# Patient Record
Sex: FEMALE | Race: BLACK OR AFRICAN AMERICAN | NOT HISPANIC OR LATINO | Employment: STUDENT | URBAN - METROPOLITAN AREA
[De-identification: names, ages, dates, MRNs, and addresses within clinical notes are randomized per-mention and may not be internally consistent; named-entity substitution may affect disease eponyms.]

---
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
## 2018-02-02 ENCOUNTER — HOSPITAL ENCOUNTER (EMERGENCY)
Facility: HOSPITAL | Age: 18
Discharge: HOME | End: 2018-02-02
Attending: EMERGENCY MEDICINE
Payer: MEDICAID

## 2018-02-02 VITALS
HEIGHT: 62 IN | BODY MASS INDEX: 25.03 KG/M2 | TEMPERATURE: 98 F | HEART RATE: 69 BPM | RESPIRATION RATE: 18 BRPM | WEIGHT: 136 LBS | DIASTOLIC BLOOD PRESSURE: 62 MMHG | SYSTOLIC BLOOD PRESSURE: 122 MMHG | OXYGEN SATURATION: 100 %

## 2018-02-02 DIAGNOSIS — S81.012A LACERATION OF LEFT KNEE, INITIAL ENCOUNTER: Primary | ICD-10-CM

## 2018-02-02 DIAGNOSIS — M25.562 LEFT KNEE PAIN: ICD-10-CM

## 2018-02-02 LAB
B-HCG UR QL: NEGATIVE
CTP QC/QA: YES

## 2018-02-02 PROCEDURE — 25000003 PHARM REV CODE 250: Performed by: PHYSICIAN ASSISTANT

## 2018-02-02 PROCEDURE — 12001 RPR S/N/AX/GEN/TRNK 2.5CM/<: CPT

## 2018-02-02 PROCEDURE — 12031 INTMD RPR S/A/T/EXT 2.5 CM/<: CPT

## 2018-02-02 PROCEDURE — 99284 EMERGENCY DEPT VISIT MOD MDM: CPT | Mod: 25

## 2018-02-02 PROCEDURE — 81025 URINE PREGNANCY TEST: CPT | Performed by: EMERGENCY MEDICINE

## 2018-02-02 RX ORDER — LIDOCAINE HYDROCHLORIDE 10 MG/ML
10 INJECTION INFILTRATION; PERINEURAL
Status: COMPLETED | OUTPATIENT
Start: 2018-02-02 | End: 2018-02-02

## 2018-02-02 RX ORDER — ACETAMINOPHEN 325 MG/1
650 TABLET ORAL
Status: COMPLETED | OUTPATIENT
Start: 2018-02-02 | End: 2018-02-02

## 2018-02-02 RX ORDER — MUPIROCIN 20 MG/G
OINTMENT TOPICAL 3 TIMES DAILY
Qty: 1 TUBE | Refills: 0 | Status: SHIPPED | OUTPATIENT
Start: 2018-02-02 | End: 2018-02-12

## 2018-02-02 RX ADMIN — ACETAMINOPHEN 650 MG: 325 TABLET ORAL at 09:02

## 2018-02-02 RX ADMIN — BACITRACIN, NEOMYCIN, POLYMYXIN B 1 EACH: 400; 3.5; 5 OINTMENT TOPICAL at 09:02

## 2018-02-02 RX ADMIN — LIDOCAINE HYDROCHLORIDE 10 ML: 10 INJECTION, SOLUTION INFILTRATION; PERINEURAL at 09:02

## 2018-02-02 NOTE — ED PROVIDER NOTES
Encounter Date: 2/2/2018    SCRIBE #1 NOTE: I, Kalina Degroot, am scribing for, and in the presence of,  Homar Odom PA-C. I have scribed the following portions of the note - Other sections scribed: HPI and ROS.       History     Chief Complaint   Patient presents with    Laceration     to left knee after bed railing fell on her. Pt has [place] laceration thats bleeding     CC: Laceration    HPI: The pt is a 17 y.o. F with no pertinent PMHx who presents to the ED c/o an actively bleeding wound to the lateral side of her L knee that she incurred this morning after the hook from her bed railing punctured her. Pt rates pain from wound as severe (10/10). No attempted treatments reported. She states that her vaccinations are up to date. Pt otherwise denies fever and other associated symptoms.       The history is provided by the patient. No  was used.     Review of patient's allergies indicates:  No Known Allergies  No past medical history on file.  Past Surgical History:   Procedure Laterality Date    VAGINA SURGERY       No family history on file.  Social History   Substance Use Topics    Smoking status: Never Smoker    Smokeless tobacco: Never Used    Alcohol use No     Review of Systems   Constitutional: Negative for chills, diaphoresis and fever.   HENT: Negative for ear pain and sore throat.    Eyes: Negative for redness.   Respiratory: Negative for cough and shortness of breath.    Cardiovascular: Negative for chest pain.   Gastrointestinal: Negative for abdominal pain, diarrhea, nausea and vomiting.   Genitourinary: Negative for dysuria.   Musculoskeletal: Negative for back pain.   Skin: Positive for wound (puncture wound to lateral side of L knee, 10/10, actively bleeding). Negative for rash.   Neurological: Negative for headaches.       Physical Exam     Initial Vitals [02/02/18 0844]   BP Pulse Resp Temp SpO2   (!) 152/73 81 18 98.2 °F (36.8 °C) 100 %      MAP       99.33         Physical  Exam    Nursing note and vitals reviewed.  Constitutional: She appears well-developed and well-nourished. She is not diaphoretic. No distress.   HENT:   Head: Normocephalic and atraumatic.   Nose: Nose normal.   Eyes: Conjunctivae and EOM are normal. Right eye exhibits no discharge. Left eye exhibits no discharge.   Neck: Normal range of motion. No tracheal deviation present. No JVD present.   Cardiovascular: Normal rate, regular rhythm and normal heart sounds. Exam reveals no friction rub.    No murmur heard.  Pulmonary/Chest: Breath sounds normal. No stridor. No respiratory distress. She has no wheezes. She has no rhonchi. She has no rales. She exhibits no tenderness.   Musculoskeletal:        Legs:  Half centimeter slightly gaping puncture wound to the lateral aspect of the left anterior knee.  Very slow active bleeding.  No visible or palpable foreign body.  Full range of motion of the knee without pain.  Pedal pulses 2+ and equal.  Sensation intact.  Ambulating without limp or pain. No hip or ankle TTP.    Neurological: She is alert and oriented to person, place, and time.   Skin: Skin is warm and dry. No rash and no abscess noted. No erythema. No pallor.         ED Course   Lac Repair  Date/Time: 2/2/2018 12:32 PM  Performed by: WINTER ORONA  Authorized by: JAIRO CHOPRA   Consent Done: Yes  Consent: Verbal consent obtained.  Consent given by: patient  Location: L knee.  Wound length (cm): 0.5cm.  Foreign bodies: no foreign bodies  Tendon involvement: none  Nerve involvement: none  Vascular damage: no  Anesthesia: local infiltration    Anesthesia:  Local Anesthetic: lidocaine 1% without epinephrine  Anesthetic total: 4 mL  Patient sedated: no  Preparation: Patient was prepped and draped in the usual sterile fashion.  Irrigation solution: saline  Irrigation method: jet lavage  Amount of cleaning: extensive  Debridement: none  Degree of undermining: none  Skin closure: 3-0 nylon  Number of sutures:  2  Technique: simple  Approximation: close  Approximation difficulty: simple  Dressing: antibiotic ointment and dressing applied  Patient tolerance: Patient tolerated the procedure well with no immediate complications        Labs Reviewed   POCT URINE PREGNANCY          X-Rays:   Independently Interpreted Readings:   Other Readings:  No acute fracture.    Medical Decision Making:   History:   Old Medical Records: I decided to obtain old medical records.      This is an emergent evaluation of a 17 y.o. female with no PMHx presenting to the ED for a laceration. Denies fever, numbness, and other injury. Vitals WNL, afebrile. Patient is non-toxic appearing and in no acute distress. No evidence of foreign body, acute fracture/dislocation, or osteomyelitis on xray. No neurovascular compromise or injury. No evidence of tendon disruption or ligamentous injury. No evidence for infection at this time.     Laceration will require closure to achieve and maintain hemostasis and to promote optimal wound healing after the wound is copiously irrigated at high pressure.Tetanus status is UTD per patient. Dressed with antibiotic ointment and non-adherent dressing. Discharged with mupirocin and instructed to follow up with PCP for reevaluation and suture removal in 10-14 days.       I discussed with the patient the diagnosis, treatment plan, indications for return to the emergency department, and for expected follow-up. The patient verbalized an understanding. The patient is asked if there are any questions or concerns. We discuss the case, until all issues are addressed to the patients satisfaction. Patient understands and is agreeable to the plan.     I discussed this patient with Dr. Roberto who is in agreement with my assessment and plan.           Scribe Attestation:   Scribe #1: I performed the above scribed service and the documentation accurately describes the services I performed. I attest to the accuracy of the  note.    Attending Attestation:     Physician Attestation Statement for NP/PA:   I discussed this assessment and plan of this patient with the NP/PA, but I did not personally examine the patient. The face to face encounter was performed by the NP/PA.        Physician Attestation for Scribe:  Physician Attestation Statement for Scribe #1: I, Homar Odom PA-C, reviewed documentation, as scribed by Kalina Degroot in my presence, and it is both accurate and complete.                 ED Course      Clinical Impression:   The primary encounter diagnosis was Laceration of left knee, initial encounter. A diagnosis of Left knee pain was also pertinent to this visit.                           Homar Odom PA-C  02/02/18 1231       Lionel Roberto MD  02/04/18 1308

## 2018-03-06 ENCOUNTER — HOSPITAL ENCOUNTER (EMERGENCY)
Facility: HOSPITAL | Age: 18
Discharge: HOME | End: 2018-03-06
Attending: EMERGENCY MEDICINE
Payer: MEDICAID

## 2018-03-06 VITALS
OXYGEN SATURATION: 100 % | HEIGHT: 62 IN | BODY MASS INDEX: 26.5 KG/M2 | HEART RATE: 80 BPM | TEMPERATURE: 98 F | WEIGHT: 144 LBS | SYSTOLIC BLOOD PRESSURE: 111 MMHG | RESPIRATION RATE: 18 BRPM | DIASTOLIC BLOOD PRESSURE: 71 MMHG

## 2018-03-06 DIAGNOSIS — R10.9 ABDOMINAL PAIN, UNSPECIFIED ABDOMINAL LOCATION: ICD-10-CM

## 2018-03-06 DIAGNOSIS — B37.9 YEAST INFECTION: Primary | ICD-10-CM

## 2018-03-06 DIAGNOSIS — N76.0 BACTERIAL VAGINOSIS: ICD-10-CM

## 2018-03-06 DIAGNOSIS — B96.89 BACTERIAL VAGINOSIS: ICD-10-CM

## 2018-03-06 LAB
ALBUMIN SERPL BCP-MCNC: 4.3 G/DL
ALP SERPL-CCNC: 77 U/L
ALT SERPL W/O P-5'-P-CCNC: 23 U/L
ANION GAP SERPL CALC-SCNC: 5 MMOL/L
AST SERPL-CCNC: 25 U/L
B-HCG UR QL: NEGATIVE
BACTERIA #/AREA URNS HPF: ABNORMAL /HPF
BACTERIA GENITAL QL WET PREP: ABNORMAL
BASOPHILS # BLD AUTO: 0.04 K/UL
BASOPHILS NFR BLD: 0.6 %
BILIRUB SERPL-MCNC: 0.4 MG/DL
BILIRUB UR QL STRIP: NEGATIVE
BUN SERPL-MCNC: 7 MG/DL
CALCIUM SERPL-MCNC: 9.3 MG/DL
CHLORIDE SERPL-SCNC: 109 MMOL/L
CLARITY UR: CLEAR
CLUE CELLS VAG QL WET PREP: ABNORMAL
CO2 SERPL-SCNC: 27 MMOL/L
COLOR UR: YELLOW
CREAT SERPL-MCNC: 0.8 MG/DL
CTP QC/QA: YES
DIFFERENTIAL METHOD: ABNORMAL
EOSINOPHIL # BLD AUTO: 0.5 K/UL
EOSINOPHIL NFR BLD: 8 %
ERYTHROCYTE [DISTWIDTH] IN BLOOD BY AUTOMATED COUNT: 13.3 %
EST. GFR  (AFRICAN AMERICAN): >60 ML/MIN/1.73 M^2
EST. GFR  (NON AFRICAN AMERICAN): >60 ML/MIN/1.73 M^2
FILAMENT FUNGI VAG WET PREP-#/AREA: ABNORMAL
GLUCOSE SERPL-MCNC: 82 MG/DL
GLUCOSE UR QL STRIP: NEGATIVE
HCT VFR BLD AUTO: 40.2 %
HGB BLD-MCNC: 13.4 G/DL
HGB UR QL STRIP: ABNORMAL
KETONES UR QL STRIP: NEGATIVE
LEUKOCYTE ESTERASE UR QL STRIP: ABNORMAL
LIPASE SERPL-CCNC: 37 U/L
LYMPHOCYTES # BLD AUTO: 1.7 K/UL
LYMPHOCYTES NFR BLD: 25.8 %
MCH RBC QN AUTO: 26.7 PG
MCHC RBC AUTO-ENTMCNC: 33.3 G/DL
MCV RBC AUTO: 80 FL
MICROSCOPIC COMMENT: ABNORMAL
MONOCYTES # BLD AUTO: 0.6 K/UL
MONOCYTES NFR BLD: 8.9 %
NEUTROPHILS # BLD AUTO: 3.8 K/UL
NEUTROPHILS NFR BLD: 56.7 %
NITRITE UR QL STRIP: NEGATIVE
PH UR STRIP: 6 [PH] (ref 5–8)
PLATELET # BLD AUTO: 237 K/UL
PMV BLD AUTO: 10.2 FL
POTASSIUM SERPL-SCNC: 4.2 MMOL/L
PROT SERPL-MCNC: 7.4 G/DL
PROT UR QL STRIP: NEGATIVE
RBC # BLD AUTO: 5.01 M/UL
RBC #/AREA URNS HPF: 10 /HPF (ref 0–4)
SODIUM SERPL-SCNC: 141 MMOL/L
SP GR UR STRIP: 1.02 (ref 1–1.03)
SPECIMEN SOURCE: ABNORMAL
T VAGINALIS GENITAL QL WET PREP: ABNORMAL
URN SPEC COLLECT METH UR: ABNORMAL
UROBILINOGEN UR STRIP-ACNC: NEGATIVE EU/DL
WBC # BLD AUTO: 6.74 K/UL
WBC #/AREA URNS HPF: 2 /HPF (ref 0–5)
WBC #/AREA VAG WET PREP: ABNORMAL
YEAST GENITAL QL WET PREP: ABNORMAL

## 2018-03-06 PROCEDURE — 87210 SMEAR WET MOUNT SALINE/INK: CPT

## 2018-03-06 PROCEDURE — 85025 COMPLETE CBC W/AUTO DIFF WBC: CPT

## 2018-03-06 PROCEDURE — 81025 URINE PREGNANCY TEST: CPT | Performed by: EMERGENCY MEDICINE

## 2018-03-06 PROCEDURE — 81000 URINALYSIS NONAUTO W/SCOPE: CPT

## 2018-03-06 PROCEDURE — 99284 EMERGENCY DEPT VISIT MOD MDM: CPT

## 2018-03-06 PROCEDURE — 87491 CHLMYD TRACH DNA AMP PROBE: CPT

## 2018-03-06 PROCEDURE — 25000003 PHARM REV CODE 250: Performed by: NURSE PRACTITIONER

## 2018-03-06 PROCEDURE — 83690 ASSAY OF LIPASE: CPT

## 2018-03-06 PROCEDURE — 80053 COMPREHEN METABOLIC PANEL: CPT

## 2018-03-06 RX ORDER — ONDANSETRON 4 MG/1
4 TABLET, ORALLY DISINTEGRATING ORAL EVERY 8 HOURS PRN
Qty: 10 TABLET | Refills: 0 | Status: SHIPPED | OUTPATIENT
Start: 2018-03-06

## 2018-03-06 RX ORDER — NAPROXEN 500 MG/1
500 TABLET ORAL
Status: COMPLETED | OUTPATIENT
Start: 2018-03-06 | End: 2018-03-06

## 2018-03-06 RX ORDER — FLUCONAZOLE 150 MG/1
150 TABLET ORAL DAILY
Qty: 1 TABLET | Refills: 0 | Status: SHIPPED | OUTPATIENT
Start: 2018-03-06 | End: 2018-03-07

## 2018-03-06 RX ORDER — METRONIDAZOLE 500 MG/1
500 TABLET ORAL EVERY 12 HOURS
Qty: 14 TABLET | Refills: 0 | Status: SHIPPED | OUTPATIENT
Start: 2018-03-06 | End: 2018-03-13

## 2018-03-06 RX ADMIN — NAPROXEN 500 MG: 500 TABLET ORAL at 02:03

## 2018-03-06 NOTE — ED TRIAGE NOTES
Patient came in PER personal transport with c/o RIGHT sided abdominal pain that wraps around the back. Patient denies any N/V at this time.

## 2018-03-06 NOTE — ED PROVIDER NOTES
Encounter Date: 3/6/2018    SCRIBE #1 NOTE: I, Sherif Santana, am scribing for, and in the presence of,  Ginger Elliott NP. I have scribed the following portions of the note - Other sections scribed: HPI and ROS.       History     Chief Complaint   Patient presents with    Abdominal Pain     RLQ pain since yesterday.  Pain has gotten worse     CC: Abdominal Pain    HPI: 17 y.o. female with no pertinent PMHx presents to ED c/o acute RLQ abdominal pain radiating to the back with associated nausea and decreased appetite since yesterday. Patient characterizes her pain as sharp and shooting. She additionally reports an episode of diarrhea 2 nights ago. She had a normal bowel movement yesterday. Patient started her menstrual cycle on 02/28/18. She previously didn't have a period for 2 mos secondary to her birth control implant. She endorses having cramps at the beginning of her cycle. She uses tampons and pads. No treatment attempted. Patient is sexually active. She denies vaginal discharge and malodor. Denies emesis, dysuria, and frequency.         The history is provided by the patient. No  was used.     Review of patient's allergies indicates:  No Known Allergies  History reviewed. No pertinent past medical history.  Past Surgical History:   Procedure Laterality Date    VAGINA SURGERY       History reviewed. No pertinent family history.  Social History   Substance Use Topics    Smoking status: Never Smoker    Smokeless tobacco: Never Used    Alcohol use No     Review of Systems   Constitutional: Positive for appetite change (decreased). Negative for diaphoresis and fever.   Gastrointestinal: Positive for abdominal pain (RLQ), diarrhea and nausea. Negative for vomiting.   Genitourinary: Negative for dysuria, frequency, pelvic pain and vaginal discharge.       Physical Exam     Initial Vitals [03/06/18 1218]   BP Pulse Resp Temp SpO2   123/63 77 16 98.5 °F (36.9 °C) 100 %      MAP       83          Physical Exam    Nursing note and vitals reviewed.  Constitutional: Vital signs are normal. She appears well-developed and well-nourished. She is cooperative.  Non-toxic appearance. She does not have a sickly appearance. She does not appear ill. No distress.   HENT:   Head: Normocephalic and atraumatic.   Eyes: Conjunctivae and EOM are normal. Pupils are equal, round, and reactive to light.   Neck: Normal range of motion.   Cardiovascular: Normal rate, regular rhythm, normal heart sounds, intact distal pulses and normal pulses. Exam reveals no decreased pulses.    No murmur heard.  Pulmonary/Chest: Effort normal and breath sounds normal. No respiratory distress. She has no decreased breath sounds. She has no wheezes. She exhibits no tenderness.   Abdominal: Soft. Normal appearance and bowel sounds are normal. She exhibits no distension and no mass. There is no hepatosplenomegaly. There is tenderness in the right lower quadrant. There is no rigidity, no rebound, no guarding, no CVA tenderness, no tenderness at McBurney's point and negative Fonseca's sign. No hernia.       Tenderness to palpation to right lower quadrant, negative psoas and obturator sign, negative McBurney's point   Genitourinary: Rectum normal and uterus normal. Pelvic exam was performed with patient supine. Cervix exhibits no motion tenderness, no discharge and no friability. Right adnexum displays no mass, no tenderness and no fullness. Left adnexum displays no mass, no tenderness and no fullness. There is bleeding in the vagina.   Genitourinary Comments: Small amount of blood noted within the vaginal vault, cervical os is closed with no blood coming through the os, no petechiae or lesions noted, no friability no CMT, no trauma to the vaginal vault, no tenderness to palpation of bilateral adnexal areas and no suprapubic tenderness.   Musculoskeletal: Normal range of motion.   Lymphadenopathy:        Right: No inguinal adenopathy present.         Left: No inguinal adenopathy present.   Neurological: She is alert and oriented to person, place, and time. She has normal reflexes.   Skin: Skin is warm, dry and intact. Capillary refill takes less than 2 seconds. No pallor.   Psychiatric: She has a normal mood and affect. Her behavior is normal. Judgment and thought content normal.         ED Course   Procedures  Labs Reviewed   URINALYSIS - Abnormal; Notable for the following:        Result Value    Occult Blood UA 3+ (*)     Leukocytes, UA Trace (*)     All other components within normal limits   URINALYSIS MICROSCOPIC - Abnormal; Notable for the following:     RBC, UA 10 (*)     All other components within normal limits   COMPREHENSIVE METABOLIC PANEL - Abnormal; Notable for the following:     Anion Gap 5 (*)     All other components within normal limits   CBC W/ AUTO DIFFERENTIAL - Abnormal; Notable for the following:     MCV 80 (*)     MCH 26.7 (*)     All other components within normal limits   VAGINAL SCREEN - Abnormal; Notable for the following:     Clue Cells, Wet Prep Few (*)     Budding Yeast Few (*)     WBC - Vaginal Screen Few (*)     Bacteria - Vaginal Screen Few (*)     All other components within normal limits   C. TRACHOMATIS/N. GONORRHOEAE BY AMP DNA   LIPASE   URINALYSIS   POCT URINE PREGNANCY                   APC / Resident Notes:   Antoni Guerrero is a 17 y.o. female who presents to the Emergency Department for evaluation of  right lower quadrant abdominal pain ×2 days, associated nausea with decreased appetite, denies vomiting, diarrhea, dysuria, hesitancy or frequency, patient is on her menstrual cycle and does have intermittent cramping related to this.  She states she has not taken any medication for pain.    Physical Exam shows a non-toxic, afebrile, and well appearing female. Pertinent exam findings include breath sounds are clear and equal in all fields, normal effort, abdomen soft and nontender HOWEVER THERE IS SOME TENDERNESS IN THE  RIGHT LOWER QUADRANT ON PALPATION, THERE IS NO CVA TENDERNESS, NO FLANK PAIN,  EXAM SHOWS Small amount of blood noted within the vaginal vault, cervical os is closed with no blood coming through the os, no petechiae or lesions noted, no friability no CMT, no trauma to the vaginal vault, no tenderness to palpation of bilateral adnexal areas and no suprapubic tenderness.  No masses felt.  Skin is free of rashes or lesions.  Remaining exam is listed above.    Vital Signs Are Reassuring. If available, previous records reviewed.     RESULTS: Vaginal screen shows clue cells, budding yeast, lipase normal, GC chlamydia pending, CMP unremarkable, CBC unremarkable, UPT negative, UA shows trace leukocyte however patient does not complain of dysuria, hesitancy, or frequency.  Shows RBCs 10 however patient is on her menstrual cycle.    My overall impression is abdominal pain I considered but do not suspect at this time appendicitis, TOA, torsion, menstrual cramping, urinary tract infection.    The patient was deemed safe and stable for discharge.      ED Course: Naproxen.Pain is completely resolved after receiving a naproxen D/C Meds: Zofran, Flagyl, Diflucan Additional D/C Information: Take medication as prescribed.  Do not drink alcohol with taking Flagyl, follow-up with primary care OB/GYN, refrain from sex while taking antibiotics, return to the emergency department for any additional abdominal pain, take Zofran as directed and increase oral hydration.  ED return precautions given.  The diagnosis, treatment plan, instructions for follow-up and reevaluation with PCP as well as ED return precautions were discussed and understanding was verbalized. All questions or concerns have been addressed. Patient was discharged home with an instructional sheet which gave not only information regarding the most likely diagnoses but also information regarding when to return to the emergency department for alarming symptoms and when to  seek further care.      This case was discussed with Dr. Rodriguez who is in agreement with my assessment and plan.          Scribe Attestation:   Scribe #1: I performed the above scribed service and the documentation accurately describes the services I performed. I attest to the accuracy of the note.    Attending Attestation:           Physician Attestation for Scribe:  Physician Attestation Statement for Scribe #1: I, Ginger Elliott NP, reviewed documentation, as scribed by Sherif Santana in my presence, and it is both accurate and complete.                    Clinical Impression:   The primary encounter diagnosis was Yeast infection. Diagnoses of Bacterial vaginosis and Abdominal pain, unspecified abdominal location were also pertinent to this visit.    Disposition:   Disposition: Discharged  Condition: Stable                        Ginger Elliott NP  03/06/18 2408

## 2018-03-06 NOTE — DISCHARGE INSTRUCTIONS
You have been diagnosed with a yeast infection and bacterial vaginal infection.  I provided you with medications for both of these please take these as directed and finish entire course.  Please stay well hydrated.  Please do not engage in sexual activity until you finish all your antibiotics.  You will need to follow up with your primary care provider and your OB/GYN for continued care and management.  Flagyl will make you sick to your stomach if you drink alcohol tangible vomit profusely.  Please refrain from drinking this.    Please return to the Emergency Department for any new or worsening symptoms including: fever, chest pain, shortness of breath, loss of consciousness, dizziness, weakness, or any other concerns.     Please follow up with your Primary Care Provider within in the week. If you do not have one, you may contact the one listed on your discharge paperwork or you may also call the [facility] Appointment Desk at [phone] to schedule an appointment with one.     Please take all medication as prescribed.

## 2018-03-07 LAB
C TRACH DNA SPEC QL NAA+PROBE: NOT DETECTED
N GONORRHOEA DNA SPEC QL NAA+PROBE: NOT DETECTED

## 2018-06-18 ENCOUNTER — HOSPITAL ENCOUNTER (EMERGENCY)
Facility: HOSPITAL | Age: 18
Discharge: HOME | End: 2018-06-18
Attending: EMERGENCY MEDICINE
Payer: MEDICAID

## 2018-06-18 VITALS
DIASTOLIC BLOOD PRESSURE: 62 MMHG | BODY MASS INDEX: 23.92 KG/M2 | HEIGHT: 62 IN | SYSTOLIC BLOOD PRESSURE: 119 MMHG | RESPIRATION RATE: 16 BRPM | OXYGEN SATURATION: 100 % | HEART RATE: 69 BPM | TEMPERATURE: 98 F | WEIGHT: 130 LBS

## 2018-06-18 DIAGNOSIS — K02.9 PAIN DUE TO DENTAL CARIES: Primary | ICD-10-CM

## 2018-06-18 LAB
B-HCG UR QL: NEGATIVE
CTP QC/QA: YES

## 2018-06-18 PROCEDURE — 99283 EMERGENCY DEPT VISIT LOW MDM: CPT

## 2018-06-18 PROCEDURE — 81025 URINE PREGNANCY TEST: CPT | Performed by: PHYSICIAN ASSISTANT

## 2018-06-18 PROCEDURE — 25000003 PHARM REV CODE 250: Performed by: PHYSICIAN ASSISTANT

## 2018-06-18 RX ORDER — ETODOLAC 300 MG/1
300 CAPSULE ORAL 2 TIMES DAILY
Qty: 10 CAPSULE | Refills: 0 | Status: SHIPPED | OUTPATIENT
Start: 2018-06-18

## 2018-06-18 RX ORDER — HYDROCODONE BITARTRATE AND ACETAMINOPHEN 5; 325 MG/1; MG/1
1 TABLET ORAL
Status: COMPLETED | OUTPATIENT
Start: 2018-06-18 | End: 2018-06-18

## 2018-06-18 RX ORDER — PENICILLIN V POTASSIUM 500 MG/1
500 TABLET, FILM COATED ORAL 4 TIMES DAILY
Qty: 28 TABLET | Refills: 0 | Status: SHIPPED | OUTPATIENT
Start: 2018-06-18 | End: 2018-06-25

## 2018-06-18 RX ORDER — PENICILLIN V POTASSIUM 250 MG/1
500 TABLET, FILM COATED ORAL
Status: COMPLETED | OUTPATIENT
Start: 2018-06-18 | End: 2018-06-18

## 2018-06-18 RX ADMIN — PENICILLIN V POTASIUM 500 MG: 250 TABLET ORAL at 12:06

## 2018-06-18 RX ADMIN — HYDROCODONE BITARTRATE AND ACETAMINOPHEN 1 TABLET: 5; 325 TABLET ORAL at 12:06

## 2018-06-18 NOTE — ED TRIAGE NOTES
"Pt here for right lower dental pain. Pt reports it started hurting a few weeks ago; subsided, now pain began again last night. Reports "I have a hole in it".   "

## 2018-06-18 NOTE — DISCHARGE INSTRUCTIONS
[facility]. [address] [place] [address].  [phone] Walk-in dental clinic, arrive 8:00 a.m..  Monday through Thursday.  Seventy-five dollars to 195 dollars per extraction.

## 2018-06-18 NOTE — ED PROVIDER NOTES
Encounter Date: 6/18/2018       History     Chief Complaint   Patient presents with    Dental Pain     States she lower dental pain on right side of jaw.       History of Present Illness    Patient Identification  Antoni Guerrero is a 17 y.o. female.    Patient information was obtained from patient.  History/Exam limitations: none.  Patient presented to the Emergency Department by private vehicle.    Chief Complaint   Dental Pain (States she lower dental pain on right side of jaw.  )      Patient who presents with complaint of toothache. Onset of symptoms was gradual starting 1 day ago. Patient describes pain as aching. Pain severity now is severe. The pain does not radiate. Patient has jaw swelling or fever >101. Pain is aggravated by palpation. Pain is alleviated by NSAIDS. The patient denies other complaints. Patient has not sought treatment by another care provider for this problem. Care prior to arrival consisted of NSAID, with no relief.     No past medical history on file.  No family history on file.    Scheduled Meds:HYDROcodone-acetaminophen, 1 tablet, Oral, ED 1 Time  penicillin v potassium, 500 mg, Oral, ED 1 Time      Continuous Infusions:   PRN Meds:    Review of patient's allergies indicates:  No Known Allergies  Social History    Marital status: Single              Spouse name:                       Years of education:                 Number of children:               Occupational History    None on file    Social History Main Topics    Smoking status: Never Smoker                                                                Smokeless tobacco: Never Used                        Alcohol use: No              Drug use: No              Sexual activity: Yes                  Other Topics            Concern    None on file    Social History Narrative    None on file                Review of patient's allergies indicates:  No Known Allergies  History reviewed. No pertinent past medical history.  Past  Surgical History:   Procedure Laterality Date    VAGINA SURGERY       History reviewed. No pertinent family history.  Social History   Substance Use Topics    Smoking status: Never Smoker    Smokeless tobacco: Never Used    Alcohol use No     Review of Systems   Constitutional: Negative for fever.   HENT: Positive for dental problem. Negative for sore throat.    Respiratory: Negative for shortness of breath.    Cardiovascular: Negative for chest pain.   Gastrointestinal: Negative for nausea.   Genitourinary: Negative for dysuria.   Musculoskeletal: Negative for back pain.   Skin: Negative for rash.   Neurological: Negative for weakness.   Hematological: Does not bruise/bleed easily.       Physical Exam     Initial Vitals [06/18/18 1231]   BP Pulse Resp Temp SpO2   119/62 69 16 98.2 °F (36.8 °C) 100 %      MAP       --         Physical Exam    Vitals reviewed.  Constitutional: She appears well-developed and well-nourished. She is not diaphoretic. No distress.   HENT:   Head: Normocephalic and atraumatic.   Right Ear: External ear normal.   Left Ear: External ear normal.   Nose: Nose normal.   Mouth/Throat: Oropharynx is clear and moist and mucous membranes are normal. No trismus in the jaw. Dental caries present. No dental abscesses or uvula swelling.       Eyes: Conjunctivae are normal. No scleral icterus.   Neck: Normal range of motion. Neck supple.   Cardiovascular: Normal rate, regular rhythm, normal heart sounds and intact distal pulses.   Pulmonary/Chest: Breath sounds normal. No respiratory distress. She has no wheezes. She has no rhonchi. She has no rales. She exhibits no tenderness.   Abdominal: Soft. She exhibits no distension and no mass. There is no tenderness. There is no rebound and no guarding.   Musculoskeletal: Normal range of motion.   Lymphadenopathy:     She has no cervical adenopathy.   Neurological: She is alert and oriented to person, place, and time.   Skin: Skin is warm and dry.          ED Course   Procedures  Labs Reviewed   POCT URINE PREGNANCY          No orders to display        Medical Decision Making:   Initial Assessment:   Patient with right lower dental caries, gingival swelling, pain to percussion.  No submandibular tenderness, erythema, or edema. Presentation most consistent with acute pulpitis versus early cellulitis.  No abscess appreciated. Patient well-appearing in obvious discomfort.  Will treat with analgesics and penicillin, and encourage close follow-up with dentist for further evaluation treatment.  Dental resources provided.                      Clinical Impression:   The encounter diagnosis was Pain due to dental caries.                             Dawson Milligan PA-C  06/18/18 1257

## 2018-09-17 ENCOUNTER — HOSPITAL ENCOUNTER (EMERGENCY)
Facility: HOSPITAL | Age: 18
Discharge: HOME | End: 2018-09-17
Attending: EMERGENCY MEDICINE
Payer: MEDICAID

## 2018-09-17 VITALS
SYSTOLIC BLOOD PRESSURE: 119 MMHG | HEART RATE: 80 BPM | RESPIRATION RATE: 16 BRPM | BODY MASS INDEX: 24.8 KG/M2 | WEIGHT: 140 LBS | DIASTOLIC BLOOD PRESSURE: 59 MMHG | HEIGHT: 63 IN | OXYGEN SATURATION: 98 % | TEMPERATURE: 99 F

## 2018-09-17 DIAGNOSIS — L73.9 FOLLICULITIS: Primary | ICD-10-CM

## 2018-09-17 LAB
B-HCG UR QL: NEGATIVE
CTP QC/QA: YES

## 2018-09-17 PROCEDURE — 81025 URINE PREGNANCY TEST: CPT | Performed by: PHYSICIAN ASSISTANT

## 2018-09-17 PROCEDURE — 25000003 PHARM REV CODE 250: Performed by: PHYSICIAN ASSISTANT

## 2018-09-17 PROCEDURE — 99283 EMERGENCY DEPT VISIT LOW MDM: CPT

## 2018-09-17 RX ORDER — MUPIROCIN 20 MG/G
1 OINTMENT TOPICAL
Status: COMPLETED | OUTPATIENT
Start: 2018-09-17 | End: 2018-09-17

## 2018-09-17 RX ORDER — IBUPROFEN 400 MG/1
400 TABLET ORAL
Status: COMPLETED | OUTPATIENT
Start: 2018-09-17 | End: 2018-09-17

## 2018-09-17 RX ADMIN — IBUPROFEN 400 MG: 400 TABLET ORAL at 10:09

## 2018-09-17 RX ADMIN — MUPIROCIN 22 G: 20 OINTMENT TOPICAL at 10:09

## 2018-09-18 NOTE — ED TRIAGE NOTES
Patient presents to the ED via personal transportation with boyfriend. Patient reports that she is here  For a reddened bump to left lower leg. Patient states that she was bit by an insect last night. Denies fever, chills, drainage from site.

## 2018-09-18 NOTE — ED PROVIDER NOTES
Encounter Date: 9/17/2018  SORT: This is a 18 y.o. female who presents for emergent consideration of possible wound infection.  Initial exam notable for inflammatory pustule with mild surrounding cellulitis. Patient will be moved to a room when one is available. Orders placed.  CHEYENNE Castillo PA-C        History     Chief Complaint   Patient presents with    Wound Infection     Small bump on her left ankle      CC: Wound Infection    HPI: 18 y.o. Female with no PMHx presents for consideration of possible insect bite versus wound infection. She reports noticing a red, raised bump on the left lower leg today. She reports that the area is tender. She denies trauma or injury. She denies fever, chills, diaphoresis, paraesthesia or further symptoms.           Review of patient's allergies indicates:  No Known Allergies  History reviewed. No pertinent past medical history.  Past Surgical History:   Procedure Laterality Date    VAGINA SURGERY       History reviewed. No pertinent family history.  Social History     Tobacco Use    Smoking status: Never Smoker    Smokeless tobacco: Never Used   Substance Use Topics    Alcohol use: No    Drug use: Yes     Types: Marijuana     Review of Systems   Constitutional: Negative for chills and fever.   HENT: Negative for congestion, ear pain and sore throat.    Eyes: Negative for pain.   Respiratory: Negative for cough and shortness of breath.    Cardiovascular: Negative for chest pain.   Gastrointestinal: Negative for abdominal pain, constipation, diarrhea, nausea and vomiting.   Genitourinary: Negative for decreased urine volume, dysuria, vaginal bleeding and vaginal discharge.   Musculoskeletal: Negative for back pain and neck pain.   Skin: Negative for rash.   Neurological: Negative for headaches.   Hematological: Does not bruise/bleed easily.   Psychiatric/Behavioral: Negative for confusion.       Physical Exam     Initial Vitals [09/17/18 2149]   BP Pulse Resp Temp SpO2    (!) 119/59 80 16 98.7 °F (37.1 °C) 98 %      MAP       --         Physical Exam    Nursing note and vitals reviewed.  Constitutional: Vital signs are normal. She appears well-developed and well-nourished. She is not diaphoretic. She is cooperative.  Non-toxic appearance. She does not have a sickly appearance. She does not appear ill. No distress.   HENT:   Head: Normocephalic and atraumatic.   Right Ear: Tympanic membrane, external ear and ear canal normal.   Left Ear: Tympanic membrane, external ear and ear canal normal.   Nose: Nose normal.   Mouth/Throat: Uvula is midline, oropharynx is clear and moist and mucous membranes are normal. No oropharyngeal exudate.   Eyes: Conjunctivae, EOM and lids are normal. Pupils are equal, round, and reactive to light.   Neck: Trachea normal, normal range of motion, full passive range of motion without pain and phonation normal. Neck supple.   Cardiovascular: Normal rate, regular rhythm, normal heart sounds and intact distal pulses. Exam reveals no gallop and no friction rub.    No murmur heard.  Pulses:       Dorsalis pedis pulses are 2+ on the right side, and 2+ on the left side.   Pulmonary/Chest: Effort normal and breath sounds normal. No respiratory distress. She has no decreased breath sounds. She has no wheezes. She has no rhonchi. She has no rales.   Abdominal: Soft. Normal appearance and bowel sounds are normal. She exhibits no distension and no mass. There is no tenderness.   Musculoskeletal: Normal range of motion.        Left lower leg: She exhibits no tenderness, no bony tenderness, no swelling, no edema, no deformity and no laceration.   Neurological: She is alert and oriented to person, place, and time. She has normal strength.   Skin: Skin is warm and dry. Capillary refill takes less than 2 seconds. Rash noted. No petechiae and no purpura noted. Rash is pustular. Rash is not macular, not papular, not maculopapular, not nodular, not vesicular and not urticarial.         Single inflammatory pustule with mild surrounding erythema and tenderness.  There is no abscess or spontaneous drainage. No rash. No desquamation.    Psychiatric: She has a normal mood and affect. Her speech is normal and behavior is normal. Judgment and thought content normal. Cognition and memory are normal.         ED Course   Procedures  Labs Reviewed   POCT URINE PREGNANCY          Imaging Results    None          Medical Decision Making:   Initial Assessment:   18 y.o. Female with no PMHx presents for consideration of possible insect bite versus wound infection. She reports noticing a red, raised bump on the left lower leg today. She reports that the area is tender. She denies trauma or injury. She denies fever, chills, diaphoresis, paraesthesia or further symptoms.   ED Management:  Exam findings: Patient is non-toxic, afebrile and well appearing. Single inflammatory pustule with mild surrounding erythema and tenderness.  There is no abscess or spontaneous drainage. No rash. No desquamation.     Results: UPT negative    My overall impression: folliculitis  DDx: wound, abscess, cellulitis, infection, other    ED course: Motrin given. Mupirocin ointment and bandage applied. I have discussed wound care and strict ED return precautions. Stable for discharge.     The diagnosis and treatment plan have been discussed with the patient. All questions and concerns have been addressed. An educational information sheet was given to the patient prior to discharge.     Yamilex Wakefiedl PA-C    Other:   I have discussed this case with another health care provider.       <> Summary of the Discussion: This case has been discussed with Dr. Rae and he is in agreement of the diagnosis and treatment plan.                           Clinical Impression:   The encounter diagnosis was Folliculitis.      Disposition:   Disposition: Discharged  Condition: Stable                        Yamilex Wakefield PA-C  09/17/18 5097

## 2018-09-18 NOTE — DISCHARGE INSTRUCTIONS
Apply the Mupirocin ointment to the area twice daily for the next 5-7 days and keep the area covered.    You can take Ibuprofen or Tylenol for pain.    Watch for worsening redness, fever or further concerning symptoms. If these occur, return to the ER.    Follow-up with your primary care provider.

## 2020-11-30 ENCOUNTER — HOSPITAL ENCOUNTER (EMERGENCY)
Facility: OTHER | Age: 20
Discharge: HOME | End: 2020-11-30
Attending: EMERGENCY MEDICINE
Payer: MEDICAID

## 2020-11-30 VITALS
HEART RATE: 73 BPM | SYSTOLIC BLOOD PRESSURE: 143 MMHG | DIASTOLIC BLOOD PRESSURE: 66 MMHG | OXYGEN SATURATION: 100 % | WEIGHT: 153 LBS | TEMPERATURE: 98 F | BODY MASS INDEX: 27.1 KG/M2 | RESPIRATION RATE: 18 BRPM

## 2020-11-30 DIAGNOSIS — J06.9 UPPER RESPIRATORY TRACT INFECTION, UNSPECIFIED TYPE: Primary | ICD-10-CM

## 2020-11-30 LAB
CTP QC/QA: YES
SARS-COV-2 RDRP RESP QL NAA+PROBE: NEGATIVE

## 2020-11-30 PROCEDURE — 99283 EMERGENCY DEPT VISIT LOW MDM: CPT

## 2020-11-30 PROCEDURE — U0002 COVID-19 LAB TEST NON-CDC: HCPCS | Performed by: EMERGENCY MEDICINE

## 2020-11-30 RX ORDER — FLUTICASONE PROPIONATE 50 MCG
1 SPRAY, SUSPENSION (ML) NASAL 2 TIMES DAILY PRN
Qty: 15 G | Refills: 0 | Status: SHIPPED | OUTPATIENT
Start: 2020-11-30

## 2020-12-01 NOTE — ED PROVIDER NOTES
Encounter Date: 11/30/2020    SCRIBE #1 NOTE: I, Johan Knight, am scribing for, and in the presence of, Dr. Lemons.       History     Chief Complaint   Patient presents with    Generalized Body Aches     pt reports generalized body aches, nasal drainage with onset yesterday     Time seen by provider: 8:32 PM    This is a 20 y.o. female who presents with complaint of generalized body aches since yesterday. Patient reports that she has been experiencing associated rhinorrhea, sneezing, and congestion as they began at the same time as her body aches.Patient denies any fever but reports one episode of chills yesterday that has not returned since. Patient has no major medical problems and only takes Claritin when needed for allergies. Patient denies a history of asthma. Patient states that she is still able to smell and taste despite it being lessened because of her stuffy state.       The history is provided by the patient.     Review of patient's allergies indicates:  No Known Allergies  No past medical history on file.  Past Surgical History:   Procedure Laterality Date    VAGINA SURGERY       No family history on file.  Social History     Tobacco Use    Smoking status: Never Smoker    Smokeless tobacco: Never Used   Substance Use Topics    Alcohol use: No    Drug use: Yes     Types: Marijuana     Review of Systems   Constitutional: Negative for chills and fever.   HENT: Positive for congestion, rhinorrhea, sneezing and sore throat.    Respiratory: Negative for shortness of breath.    Cardiovascular: Negative for chest pain.   Gastrointestinal: Positive for diarrhea. Negative for nausea and vomiting.   Genitourinary: Negative for dysuria.   Musculoskeletal: Negative for back pain.   Skin: Negative for rash.   Neurological: Negative for weakness.   Hematological: Does not bruise/bleed easily.       Physical Exam     Initial Vitals [11/30/20 1944]   BP Pulse Resp Temp SpO2   (!) 143/66 73 18 98.4 °F (36.9 °C)  100 %      MAP       --         Physical Exam    Nursing note and vitals reviewed.  Constitutional: She appears well-developed and well-nourished. No distress.   HENT:   Head: Normocephalic and atraumatic.   Mouth/Throat: Oropharynx is clear and moist.   Mucous membranes are moist.   Eyes: Conjunctivae and EOM are normal. Pupils are equal, round, and reactive to light. No scleral icterus.   No pallor or icterus.   Neck: Normal range of motion. Neck supple. No JVD present.   Cardiovascular: Normal rate, regular rhythm and normal heart sounds. Exam reveals no gallop and no friction rub.    No murmur heard.  Pulmonary/Chest: Breath sounds normal. No respiratory distress. She has no wheezes. She has no rhonchi. She has no rales.   Abdominal: Soft. There is no abdominal tenderness. There is no rebound and no guarding.   Musculoskeletal: Normal range of motion. No tenderness or edema.   Neurological: She is alert and oriented to person, place, and time. She has normal strength. No sensory deficit.   Skin: Skin is warm and dry. No rash noted.   Psychiatric: She has a normal mood and affect. Her behavior is normal. Judgment and thought content normal.         ED Course   Procedures  Labs Reviewed   SARS-COV-2 RDRP GENE          Imaging Results    None          Medical Decision Making:   History:   Old Medical Records: I decided to obtain old medical records.  Clinical Tests:   Lab Tests: Ordered and Reviewed            Scribe Attestation:   Scribe #1: I performed the above scribed service and the documentation accurately describes the services I performed. I attest to the accuracy of the note.    Attending Attestation:           Physician Attestation for Scribe:  Physician Attestation Statement for Scribe #1: I, Dr. Lemons, reviewed documentation, as scribed by Johan Knight in my presence, and it is both accurate and complete.                    Patient presents with nasal congestion, cough, runny nose.  No fevers or  change in sense of smell/taste.  She was concerned that she may have been exposed to COVID, stating that her cousin had tested positive at and she saw him a couple days ago however this was after a 2 week quarantine.  So advised the patient that her cousin should not have been infectious at that point.  Her COVID test here is negative.  In addition to cetirizine will write prescription for Flonase.  Return precautions discussed.  Encouraged follow-up with primary care, especially if symptoms persist.            Clinical Impression:     ICD-10-CM ICD-9-CM   1. Upper respiratory tract infection, unspecified type  J06.9 465.9                         ED Disposition Condition    Discharge Stable        ED Prescriptions     Medication Sig Dispense Start Date End Date Auth. Provider    fluticasone propionate (FLONASE) 50 mcg/actuation nasal spray 1 spray (50 mcg total) by Each Nostril route 2 (two) times daily as needed for Rhinitis. 15 g 11/30/2020  Sanket Lemons II, MD        Follow-up Information     Follow up With Specialties Details Why Contact Info    Safia Corona MD Pediatrics In 1 week  [address]  [address]  [facility]  [facility] [address]  [phone]                                         Sanket Lemons II, MD  11/30/20 3717

## 2020-12-01 NOTE — ED NOTES
Chief Complaint   Patient presents with    Generalized Body Aches     pt reports generalized body aches, nasal drainage with onset yesterday     Patient presents to the ED with c/o congestion, headache, and chills at times since ysterday.  Patient unsure if she's had a fever.  Denies N/V, states diarrhea at times.  Patient is AOx4, respirations even, unlabored.

## 2024-09-17 ENCOUNTER — HOSPITAL ENCOUNTER (EMERGENCY)
Facility: HOSPITAL | Age: 24
Discharge: HOME | End: 2024-09-17
Attending: EMERGENCY MEDICINE

## 2024-09-17 VITALS
BODY MASS INDEX: 23.92 KG/M2 | DIASTOLIC BLOOD PRESSURE: 73 MMHG | HEIGHT: 62 IN | WEIGHT: 130 LBS | TEMPERATURE: 99 F | SYSTOLIC BLOOD PRESSURE: 109 MMHG | RESPIRATION RATE: 20 BRPM | HEART RATE: 60 BPM | OXYGEN SATURATION: 99 %
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
## 2024-09-17 DIAGNOSIS — M25.529 ELBOW PAIN: ICD-10-CM

## 2024-09-17 LAB
B-HCG UR QL: NEGATIVE
CTP QC/QA: YES

## 2024-09-17 PROCEDURE — 99283 EMERGENCY DEPT VISIT LOW MDM: CPT | Mod: 25

## 2024-09-17 PROCEDURE — 81025 URINE PREGNANCY TEST: CPT | Performed by: EMERGENCY MEDICINE

## 2024-09-17 RX ORDER — IBUPROFEN 600 MG/1
600 TABLET ORAL EVERY 6 HOURS PRN
Qty: 20 TABLET | Refills: 0 | Status: SHIPPED | OUTPATIENT
Start: 2024-09-17

## 2024-09-17 RX ORDER — METHOCARBAMOL 500 MG/1
1000 TABLET, FILM COATED ORAL 3 TIMES DAILY
Qty: 20 TABLET | Refills: 0 | Status: SHIPPED | OUTPATIENT
Start: 2024-09-17 | End: 2024-09-22

## 2024-09-17 NOTE — Clinical Note
"Antoni Guerrero (Keionn) was seen and treated in our emergency department on 9/17/2024.  She may return to work on 09/19/2024.       If you have any questions or concerns, please don't hesitate to call.      Tamiko Romo PA-C"

## 2024-09-17 NOTE — DISCHARGE INSTRUCTIONS
Take muscle relaxers as prescribed.  Apply ice or heat to the areas for pain relief.  Rest.  Return to the emergency department for any change or concerning symptoms.  You to follow-up with your primary care doctor for further work notes.

## 2024-09-17 NOTE — ED PROVIDER NOTES
Encounter Date: 9/17/2024    SCRIBE #1 NOTE: I, oRmie Shen, am scribing for, and in the presence of,  Tamiko Romo PA-C.       History     Chief Complaint   Patient presents with    Fall     Pt slipped and fell in the shower last night. Reports right arm/shoulder pain. No deformities. Tylenol at 0500     CC: Fall    HPI:  This is a 24 y.o. female with no pertinent PMHx, presents to the ED for evaluation of R elbow and shoulder pain s/p slip and fall that occurred last night. Patient reports that she had slipped and fell inside the tub, landing on her RUE. Reports of pain to the R shoulder and elbow, with some swelling noted to the R elbow. States that her pain is an 8/10 in severity. Attempted treatment for pain with Tylenol, which had some mild improvement. Denies numbness, tingling or any associated symptoms. She is R hand dominant.     The history is provided by the patient. No  was used.     Review of patient's allergies indicates:  No Known Allergies  History reviewed. No pertinent past medical history.  Past Surgical History:   Procedure Laterality Date    VAGINA SURGERY       No family history on file.  Social History     Tobacco Use    Smoking status: Never    Smokeless tobacco: Never   Substance Use Topics    Alcohol use: No    Drug use: Yes     Types: Marijuana     Review of Systems   Constitutional:  Negative for fever.   HENT:  Negative for sore throat.    Eyes:  Negative for visual disturbance.   Respiratory:  Negative for shortness of breath.    Cardiovascular:  Negative for chest pain.   Gastrointestinal:  Negative for abdominal pain, nausea and vomiting.   Genitourinary:  Negative for dysuria.   Musculoskeletal:  Positive for arthralgias and joint swelling.   Skin:  Negative for rash.   Neurological:  Negative for numbness.        (-) Paresthesia.    All other systems reviewed and are negative.      Physical Exam     Initial Vitals [09/17/24 0936]   BP Pulse Resp Temp  SpO2   118/68 70 18 98.1 °F (36.7 °C) 100 %      MAP       --         Physical Exam    Nursing note and vitals reviewed.  Constitutional: She appears well-developed and well-nourished. She is not diaphoretic. No distress.   HENT:   Head: Normocephalic.   Eyes: EOM are normal. Pupils are equal, round, and reactive to light.   Neck: Neck supple.   There is no midline cervical tenderness to palpation.  No bony step-off noted.   Normal range of motion.  Cardiovascular:  Normal rate and regular rhythm.           Pulmonary/Chest: Breath sounds normal. No respiratory distress.   Abdominal: Abdomen is soft. Bowel sounds are normal. She exhibits no distension. There is no abdominal tenderness. There is no rebound and no guarding.   Musculoskeletal:      Cervical back: Normal range of motion and neck supple.      Comments: There is no midline thoracic or lumbar spinal tenderness.  There is mild muscle spasms noted to the right trapezius area.  No bony tenderness to the right shoulder or deformity.  There is tenderness to the right elbow.  No edema, erythema, ecchymosis noted.  Sensation is intact in the bilateral upper extremities.  2+ radial pulses noted as well.  There is limited range of motion of the right shoulder and elbow secondary to pain.     Neurological: She is alert and oriented to person, place, and time.   Skin: Skin is warm. Capillary refill takes less than 2 seconds. No erythema.         ED Course   Procedures  Labs Reviewed   POCT URINE PREGNANCY       Result Value    POC Preg Test, Ur Negative       Acceptable Yes            Imaging Results    None       X-Rays:   Independently Interpreted Readings:   Other Readings:  X-ray of the right elbow and right shoulder reveals no acute fractures or dislocations.    Medications - No data to display  Medical Decision Making  Amount and/or Complexity of Data Reviewed  Labs: ordered. Decision-making details documented in ED Course.  Radiology: ordered.  Decision-making details documented in ED Course.    Risk  Prescription drug management.         APC / Resident Notes:   This is an urgent evaluation of a 26-year-old female presents to the emergency department after fall on a tub.  She complains of right shoulder and elbow pain.      The patient is currently afebrile and nontoxic in appearance.  Vital signs are stable.  Physical exam revealed no bony abnormalities.  There was limited range of motion secondary to pain and some mild muscle spasms.  X-rays were performed which revealed no acute fracture of the shoulder or right elbow.  I will treat her supportively and have her follow-up with primary care.  This was discussed in detail with the patient she verbalized understanding and agreement.  She is currently safe and stable for discharge at this time.     Scribe Attestation:   Scribe #1: I performed the above scribed service and the documentation accurately describes the services I performed. I attest to the accuracy of the note.                             I, Tamiko Romo PA-C , personally performed the services described in this documentation. All medical record entries made by the scribe were at my direction and in my presence. I have reviewed the chart and agree that the record reflects my personal performance and is accurate and complete.      Clinical Impression:  Final diagnoses:  [M25.529] Elbow pain  [W19.XXXA] Fall                 Tamiko Romo PA-C  09/17/24 1230       Tamiko Romo PA-C  09/17/24 1230